# Patient Record
(demographics unavailable — no encounter records)

---

## 2025-03-21 NOTE — HISTORY OF PRESENT ILLNESS
[Gradual] : gradual [7] : 7 [0] : 0 [Dull/Aching] : dull/aching [Intermittent] : intermittent [Household chores] : household chores [Ice] : ice [Injection therapy] : injection therapy [de-identified] : 36 y/o with bilateral knee pain for years. Treated with Euflexxa in past with good relief. Pain starting up again. No relief with cortisone. No new injuries  5/26/23: here for Euflexxa Injection #1 for bilateral knees. Notes injections has given relief in past.   06/02/23 bl knees euflexxa #2  06/15/23 bl knees euflexxa # 3   03/21/25: Patient here for bilateral knee pain. Patient requesting gel injections.  [] : no [FreeTextEntry1] : KNEES [FreeTextEntry5] : no injury [de-identified] : euflexxa injections

## 2025-03-21 NOTE — PHYSICAL EXAM
[Left] : left knee [NL (0)] : extension 0 degrees [5___] : hamstring 5[unfilled]/5 [Negative] : negative Radha's [] : patient ambulates with assistive device [[id number]] : flexion 135 degrees [de-identified] : extension -10 degrees

## 2025-03-21 NOTE — IMAGING
[Bilateral] : knee bilaterally [All Views] : anteroposterior, lateral, skyline, and anteroposterior standing [Moderate tricompartmental OA medial narrowing] : Moderate tricompartmental OA medial narrowing [FreeTextEntry9] : LEFT > RIGHT

## 2025-03-21 NOTE — DISCUSSION/SUMMARY
[de-identified] : I, Bre Greenwood, am scribing for Dr. Rodriguez in his presence for the chief complaint, physical exam, studies, assessment, and/or plan.

## 2025-03-21 NOTE — ASSESSMENT
[FreeTextEntry1] : 39 year M WITH MODERATE B/L KNEE PAIN. PAIN WORSENS WITH STAIRS AND WALKING PROLONGED DISTANCES. PAIN IS AFFECTING ADL AND FUNCTIONAL ACTIVITIES. XRAYS REVIEWED WITH LT KNEE MODERATE MEDIAL OA, RT KNEE MILD MEDIAL OA. TREATMENT OPTIONS REVIEWED. QUESTIONS ANSWERED.   WILL REQUEST AUTH FOR B/L KNEE EUFLEXXA. THIS INJECTION IS MEDICALLY NECESSARY DUE TO SEVERE PAIN AND OA.

## 2025-05-09 NOTE — PROCEDURE
[de-identified] : Procedure Name: Orthovisc (Large Joint)  Viscosupplementation Injection: X-ray evidence of Osteoarthritis on this or prior visit, Patient has tried OTC's including aspirin, Ibuprofen, Aleve etc or prescription NSAIDS, and/or exercises at home and/ or physical therapy without satisfactory response and Repeat series performed because patient had significant improvement in their pain and functional capacity from prior series which was given more than six months ago.   An injection of Orthovisc 2ml #1 was injected into the bilateral knee(s). The risks, benefits, and alternatives to Viscosupplementation injection were explained in full to the patient. Risks outlined include but are not limited to infection, sepsis, bleeding, scarring, skin discoloration, temporary increase in pain, syncopal episode, failure to resolve symptoms, allergic reaction, and symptom recurrence. Signs and symptoms of infection reviewed and patient advised to call immediately for redness, fevers, and/or chills. Patient understood the risks. All questions were answered. After discussion of options, patient requested Viscosupplementation. Oral informed consent was obtained and sterile prep was done of the injection site. The patient tolerated the procedure well. Ice tonight to the injection site.

## 2025-05-09 NOTE — HISTORY OF PRESENT ILLNESS
[Gradual] : gradual [7] : 7 [0] : 0 [Dull/Aching] : dull/aching [Intermittent] : intermittent [Household chores] : household chores [Ice] : ice [Injection therapy] : injection therapy [de-identified] : 38 y/o with bilateral knee pain for years. Treated with Euflexxa in past with good relief. Pain starting up again. No relief with cortisone. No new injuries  5/26/23: here for Euflexxa Injection #1 for bilateral knees. Notes injections has given relief in past.   06/02/23 bl knees euflexxa #2  06/15/23 bl knees euflexxa # 3   03/21/25: Patient here for bilateral knee pain. Patient requesting gel injections.   5.9.25 PATIENT HERE TO START BILATERAL KNEE ORTHOVISC [] : no [FreeTextEntry1] : KNEES [FreeTextEntry5] : no injury [de-identified] : euflexxa injections

## 2025-05-09 NOTE — DISCUSSION/SUMMARY
[de-identified] : I, Bre Greenwood, am scribing for Dr. Rodriguez in his presence for the chief complaint, physical exam, studies, assessment, and/or plan.

## 2025-05-09 NOTE — ASSESSMENT
[FreeTextEntry1] : 39 year M WITH MODERATE B/L KNEE PAIN. PAIN WORSENS WITH STAIRS AND WALKING PROLONGED DISTANCES. PAIN IS AFFECTING ADL AND FUNCTIONAL ACTIVITIES. XRAYS REVIEWED WITH LT KNEE MODERATE MEDIAL OA, RT KNEE MILD MEDIAL OA. TREATMENT OPTIONS REVIEWED. QUESTIONS ANSWERED.   BILATERAL KNEE ORTHOVSIC ANAHY WELL RTN 1 WEEK TO CONT

## 2025-05-09 NOTE — PHYSICAL EXAM
[Left] : left knee [NL (0)] : extension 0 degrees [5___] : hamstring 5[unfilled]/5 [Negative] : negative Radha's [] : patient ambulates with assistive device [[id number]] : flexion 135 degrees [de-identified] : extension -10 degrees

## 2025-05-16 NOTE — PROCEDURE
[de-identified] : Procedure Name: Orthovisc (Large Joint)  Viscosupplementation Injection: X-ray evidence of Osteoarthritis on this or prior visit, Patient has tried OTC's including aspirin, Ibuprofen, Aleve etc or prescription NSAIDS, and/or exercises at home and/ or physical therapy without satisfactory response and Repeat series performed because patient had significant improvement in their pain and functional capacity from prior series which was given more than six months ago.   An injection of Orthovisc 2ml #2 was injected into the bilateral knee(s). The risks, benefits, and alternatives to Viscosupplementation injection were explained in full to the patient. Risks outlined include but are not limited to infection, sepsis, bleeding, scarring, skin discoloration, temporary increase in pain, syncopal episode, failure to resolve symptoms, allergic reaction, and symptom recurrence. Signs and symptoms of infection reviewed and patient advised to call immediately for redness, fevers, and/or chills. Patient understood the risks. All questions were answered. After discussion of options, patient requested Viscosupplementation. Oral informed consent was obtained and sterile prep was done of the injection site. The patient tolerated the procedure well. Ice tonight to the injection site.

## 2025-05-16 NOTE — HISTORY OF PRESENT ILLNESS
[Gradual] : gradual [7] : 7 [0] : 0 [Dull/Aching] : dull/aching [Intermittent] : intermittent [Household chores] : household chores [Ice] : ice [Injection therapy] : injection therapy [de-identified] : 36 y/o with bilateral knee pain for years. Treated with Euflexxa in past with good relief. Pain starting up again. No relief with cortisone. No new injuries  5/26/23: here for Euflexxa Injection #1 for bilateral knees. Notes injections has given relief in past.   06/02/23 bl knees euflexxa #2  06/15/23 bl knees euflexxa # 3   03/21/25: Patient here for bilateral knee pain. Patient requesting gel injections.   5.9.25 PATIENT HERE TO START BILATERAL KNEE ORTHOVISC  5.16.25 PATIENT HERE FOR BILATERAL KNEE PAIN. ORTHOVISC #2 [] : no [FreeTextEntry1] : KNEES [FreeTextEntry5] : no injury [de-identified] : euflexxa injections

## 2025-05-16 NOTE — PHYSICAL EXAM
[Left] : left knee [NL (0)] : extension 0 degrees [5___] : hamstring 5[unfilled]/5 [Negative] : negative Radha's [] : patient ambulates with assistive device [[id number]] : flexion 135 degrees [de-identified] : extension -10 degrees

## 2025-05-16 NOTE — ASSESSMENT
[FreeTextEntry1] : 39 year M WITH MODERATE B/L KNEE PAIN. PAIN WORSENS WITH STAIRS AND WALKING PROLONGED DISTANCES. PAIN IS AFFECTING ADL AND FUNCTIONAL ACTIVITIES. XRAYS REVIEWED WITH LT KNEE MODERATE MEDIAL OA, RT KNEE MILD MEDIAL OA. TREATMENT OPTIONS REVIEWED. QUESTIONS ANSWERED.   BILATERAL KNEE ORTHOVSIC #2 ANAHY WELL

## 2025-05-23 NOTE — PHYSICAL EXAM
[Left] : left knee [NL (0)] : extension 0 degrees [5___] : hamstring 5[unfilled]/5 [Negative] : negative Radha's [] : patient ambulates with assistive device [[id number]] : flexion 135 degrees [de-identified] : extension -10 degrees

## 2025-05-23 NOTE — PROCEDURE
[de-identified] : Procedure Name: Orthovisc (Large Joint)  Viscosupplementation Injection: X-ray evidence of Osteoarthritis on this or prior visit, Patient has tried OTC's including aspirin, Ibuprofen, Aleve etc or prescription NSAIDS, and/or exercises at home and/ or physical therapy without satisfactory response and Repeat series performed because patient had significant improvement in their pain and functional capacity from prior series which was given more than six months ago.   An injection of Orthovisc 2ml #3 was injected into the bilateral knee(s). The risks, benefits, and alternatives to Viscosupplementation injection were explained in full to the patient. Risks outlined include but are not limited to infection, sepsis, bleeding, scarring, skin discoloration, temporary increase in pain, syncopal episode, failure to resolve symptoms, allergic reaction, and symptom recurrence. Signs and symptoms of infection reviewed and patient advised to call immediately for redness, fevers, and/or chills. Patient understood the risks. All questions were answered. After discussion of options, patient requested Viscosupplementation. Oral informed consent was obtained and sterile prep was done of the injection site. The patient tolerated the procedure well. Ice tonight to the injection site.

## 2025-05-23 NOTE — HISTORY OF PRESENT ILLNESS
[Gradual] : gradual [7] : 7 [0] : 0 [Dull/Aching] : dull/aching [Intermittent] : intermittent [Household chores] : household chores [Ice] : ice [Injection therapy] : injection therapy [de-identified] : 5.23.25: PATIENT HERE FOR BILATERAL KNEE PAIN. ORHTOVISC INJ #3.  [] : no [FreeTextEntry1] : KNEES [FreeTextEntry5] : no injury [de-identified] : euflexxa injections

## 2025-05-23 NOTE — ASSESSMENT
[FreeTextEntry1] : 39 year M WITH MODERATE B/L KNEE PAIN. PAIN WORSENS WITH STAIRS AND WALKING PROLONGED DISTANCES. PAIN IS AFFECTING ADL AND FUNCTIONAL ACTIVITIES. XRAYS REVIEWED WITH LT KNEE MODERATE MEDIAL OA, RT KNEE MILD MEDIAL OA. TREATMENT OPTIONS REVIEWED. QUESTIONS ANSWERED.   BILATERAL KNEE ORTHOVSIC #3 ANAHY WELL

## 2025-05-30 NOTE — PHYSICAL EXAM
[Left] : left knee [NL (0)] : extension 0 degrees [5___] : hamstring 5[unfilled]/5 [Negative] : negative Radha's [] : patient ambulates with assistive device [[id number]] : flexion 135 degrees [de-identified] : extension -10 degrees

## 2025-05-30 NOTE — PHYSICAL EXAM
[Left] : left knee [NL (0)] : extension 0 degrees [5___] : hamstring 5[unfilled]/5 [Negative] : negative Radha's [] : patient ambulates with assistive device [[id number]] : flexion 135 degrees [de-identified] : extension -10 degrees

## 2025-05-30 NOTE — HISTORY OF PRESENT ILLNESS
[Gradual] : gradual [7] : 7 [0] : 0 [Dull/Aching] : dull/aching [Intermittent] : intermittent [Household chores] : household chores [Ice] : ice [Injection therapy] : injection therapy [de-identified] : 5.23.25: PATIENT HERE FOR BILATERAL KNEE PAIN. ORHTOVISC INJ #3.   5.30.25: PATIENT HERE FOR BILATERAL KNEE PAIN. ORTHOVISC INJ #4. [] : no [FreeTextEntry1] : KNEES [FreeTextEntry5] : no injury [de-identified] : euflexxa injections

## 2025-05-30 NOTE — PROCEDURE
[de-identified] : Procedure Name: Orthovisc (Large Joint)  Viscosupplementation Injection: X-ray evidence of Osteoarthritis on this or prior visit, Patient has tried OTC's including aspirin, Ibuprofen, Aleve etc or prescription NSAIDS, and/or exercises at home and/ or physical therapy without satisfactory response and Repeat series performed because patient had significant improvement in their pain and functional capacity from prior series which was given more than six months ago.   An injection of Orthovisc 2ml #4 was injected into the bilateral knee(s). The risks, benefits, and alternatives to Viscosupplementation injection were explained in full to the patient. Risks outlined include but are not limited to infection, sepsis, bleeding, scarring, skin discoloration, temporary increase in pain, syncopal episode, failure to resolve symptoms, allergic reaction, and symptom recurrence. Signs and symptoms of infection reviewed and patient advised to call immediately for redness, fevers, and/or chills. Patient understood the risks. All questions were answered. After discussion of options, patient requested Viscosupplementation. Oral informed consent was obtained and sterile prep was done of the injection site. The patient tolerated the procedure well. Ice tonight to the injection site.

## 2025-05-30 NOTE — HISTORY OF PRESENT ILLNESS
[Gradual] : gradual [7] : 7 [0] : 0 [Dull/Aching] : dull/aching [Intermittent] : intermittent [Household chores] : household chores [Ice] : ice [Injection therapy] : injection therapy [de-identified] : 5.23.25: PATIENT HERE FOR BILATERAL KNEE PAIN. ORHTOVISC INJ #3.   5.30.25: PATIENT HERE FOR BILATERAL KNEE PAIN. ORTHOVISC INJ #4. [] : no [FreeTextEntry1] : KNEES [FreeTextEntry5] : no injury [de-identified] : euflexxa injections

## 2025-05-30 NOTE — PROCEDURE
[de-identified] : Procedure Name: Orthovisc (Large Joint)  Viscosupplementation Injection: X-ray evidence of Osteoarthritis on this or prior visit, Patient has tried OTC's including aspirin, Ibuprofen, Aleve etc or prescription NSAIDS, and/or exercises at home and/ or physical therapy without satisfactory response and Repeat series performed because patient had significant improvement in their pain and functional capacity from prior series which was given more than six months ago.   An injection of Orthovisc 2ml #4 was injected into the bilateral knee(s). The risks, benefits, and alternatives to Viscosupplementation injection were explained in full to the patient. Risks outlined include but are not limited to infection, sepsis, bleeding, scarring, skin discoloration, temporary increase in pain, syncopal episode, failure to resolve symptoms, allergic reaction, and symptom recurrence. Signs and symptoms of infection reviewed and patient advised to call immediately for redness, fevers, and/or chills. Patient understood the risks. All questions were answered. After discussion of options, patient requested Viscosupplementation. Oral informed consent was obtained and sterile prep was done of the injection site. The patient tolerated the procedure well. Ice tonight to the injection site.

## 2025-05-30 NOTE — ASSESSMENT
[FreeTextEntry1] : 39 year M WITH MODERATE B/L KNEE PAIN. PAIN WORSENS WITH STAIRS AND WALKING PROLONGED DISTANCES. PAIN IS AFFECTING ADL AND FUNCTIONAL ACTIVITIES. XRAYS REVIEWED WITH LT KNEE MODERATE MEDIAL OA, RT KNEE MILD MEDIAL OA. TREATMENT OPTIONS REVIEWED. QUESTIONS ANSWERED.   BILATERAL KNEE ORTHOVSIC #4 ANAHY WELL